# Patient Record
Sex: MALE | Race: AMERICAN INDIAN OR ALASKA NATIVE
[De-identification: names, ages, dates, MRNs, and addresses within clinical notes are randomized per-mention and may not be internally consistent; named-entity substitution may affect disease eponyms.]

---

## 2021-04-13 ENCOUNTER — HOSPITAL ENCOUNTER (OUTPATIENT)
Dept: HOSPITAL 5 - CATHLABREC | Age: 61
Discharge: HOME | End: 2021-04-13
Attending: INTERNAL MEDICINE
Payer: MEDICAID

## 2021-04-13 VITALS — DIASTOLIC BLOOD PRESSURE: 100 MMHG | SYSTOLIC BLOOD PRESSURE: 150 MMHG

## 2021-04-13 DIAGNOSIS — K21.9: ICD-10-CM

## 2021-04-13 DIAGNOSIS — Z98.890: ICD-10-CM

## 2021-04-13 DIAGNOSIS — Z83.3: ICD-10-CM

## 2021-04-13 DIAGNOSIS — I50.9: ICD-10-CM

## 2021-04-13 DIAGNOSIS — Z79.899: ICD-10-CM

## 2021-04-13 DIAGNOSIS — R94.30: Primary | ICD-10-CM

## 2021-04-13 DIAGNOSIS — E11.9: ICD-10-CM

## 2021-04-13 DIAGNOSIS — I25.2: ICD-10-CM

## 2021-04-13 DIAGNOSIS — Z82.49: ICD-10-CM

## 2021-04-13 DIAGNOSIS — G47.30: ICD-10-CM

## 2021-04-13 DIAGNOSIS — I25.10: ICD-10-CM

## 2021-04-13 DIAGNOSIS — E78.5: ICD-10-CM

## 2021-04-13 DIAGNOSIS — E66.9: ICD-10-CM

## 2021-04-13 DIAGNOSIS — I11.0: ICD-10-CM

## 2021-04-13 LAB
BASOPHILS # (AUTO): 0.1 K/MM3 (ref 0–0.1)
BASOPHILS NFR BLD AUTO: 0.9 % (ref 0–1.8)
BUN SERPL-MCNC: 19 MG/DL (ref 9–20)
BUN/CREAT SERPL: 15 %
CALCIUM SERPL-MCNC: 9.1 MG/DL (ref 8.4–10.2)
EOSINOPHIL # BLD AUTO: 0.3 K/MM3 (ref 0–0.4)
EOSINOPHIL NFR BLD AUTO: 4.1 % (ref 0–4.3)
HCT VFR BLD CALC: 36 % (ref 35.5–45.6)
HEMOLYSIS INDEX: 0
HGB BLD-MCNC: 11.8 GM/DL (ref 11.8–15.2)
INR PPP: 0.97 (ref 0.87–1.13)
LYMPHOCYTES # BLD AUTO: 2 K/MM3 (ref 1.2–5.4)
LYMPHOCYTES NFR BLD AUTO: 30.6 % (ref 13.4–35)
MCHC RBC AUTO-ENTMCNC: 33 % (ref 32–34)
MCV RBC AUTO: 77 FL (ref 84–94)
MONOCYTES # (AUTO): 0.6 K/MM3 (ref 0–0.8)
MONOCYTES % (AUTO): 8.7 % (ref 0–7.3)
PLATELET # BLD: 453 K/MM3 (ref 140–440)
RBC # BLD AUTO: 4.69 M/MM3 (ref 3.65–5.03)

## 2021-04-13 PROCEDURE — 85025 COMPLETE CBC W/AUTO DIFF WBC: CPT

## 2021-04-13 PROCEDURE — 85610 PROTHROMBIN TIME: CPT

## 2021-04-13 PROCEDURE — 99156 MOD SED OTH PHYS/QHP 5/>YRS: CPT

## 2021-04-13 PROCEDURE — 80048 BASIC METABOLIC PNL TOTAL CA: CPT

## 2021-04-13 PROCEDURE — 93458 L HRT ARTERY/VENTRICLE ANGIO: CPT

## 2021-04-13 PROCEDURE — 93005 ELECTROCARDIOGRAM TRACING: CPT

## 2021-04-13 PROCEDURE — 85730 THROMBOPLASTIN TIME PARTIAL: CPT

## 2021-04-13 PROCEDURE — 36415 COLL VENOUS BLD VENIPUNCTURE: CPT

## 2021-04-13 PROCEDURE — C1894 INTRO/SHEATH, NON-LASER: HCPCS

## 2021-04-13 RX ADMIN — LIDOCAINE HYDROCHLORIDE ONE ML: 20 INJECTION, SOLUTION INFILTRATION; PERINEURAL at 10:35

## 2021-04-13 RX ADMIN — FENTANYL CITRATE ONE MCG: 50 INJECTION, SOLUTION INTRAMUSCULAR; INTRAVENOUS at 10:40

## 2021-04-13 RX ADMIN — MIDAZOLAM ONE MG: 1 INJECTION INTRAMUSCULAR; INTRAVENOUS at 10:40

## 2021-04-13 RX ADMIN — LIDOCAINE HYDROCHLORIDE ONE ML: 20 INJECTION, SOLUTION INFILTRATION; PERINEURAL at 10:41

## 2021-04-13 RX ADMIN — MIDAZOLAM ONE MG: 1 INJECTION INTRAMUSCULAR; INTRAVENOUS at 10:35

## 2021-04-13 RX ADMIN — FENTANYL CITRATE ONE MCG: 50 INJECTION, SOLUTION INTRAMUSCULAR; INTRAVENOUS at 10:33

## 2021-04-13 NOTE — CARDIAC CATHERIZATION REPORT
CARDIAC CATHETERIZATION REPORT 



REASON FOR PROCEDURE:  The patient is a 61-year-old man with no significant

cardiac symptoms, underwent a cardiac PET scan that demonstrates lateral wall

ischemia, prompting a recommendation for cardiac catheterization. 



PROCEDURES: 

1.  Left heart catheterization. 

2.  Selective left and right coronary angiography. 

3.  Left ventricular angiography. 

4.  Sedation time, start 10:40, end 10:56. 



I was present for the entire procedure and supervised the moderate sedation

protocol.



The patient was prepped and draped in a sterile fashion after informed consent. 

The right radial cath site was prepped and draped after a negative Vishal's test.

 The right radial artery was entered using Seldinger technique followed by

placement of a 6-Azeri hydrophilic sheath.  Routine radial cocktail was

administered via the sheath.



Selective left and right coronary angiography was performed using a #3.5 left

Angy, and a #4 right Angy.  A pigtail catheter was used for left

ventricular angiography.  The catheters were then removed, sheath removed, and

hemostasis achieved using a TR band.  The patient was returned to the

postprocedure unit in stable condition.  There were no complications. 



FINDINGS: 

HEMODYNAMICS:  Left ventricular end-diastolic pressure was 25, following

coronary angiography.  Ascending aortic pressure 172/103.  There was no

significant pressure gradient on pullback across the aortic valve. 



CORONARY ANGIOGRAPHY:  The left main coronary artery was free of significant

disease. 



There was a long, up to 20% luminal stenosis of the proximal LAD.  This was

followed by another, 50% stenosis of the mid LAD.  Otherwise, mild luminal

irregularities were noted in the LAD and diagonal branches. 



The first obtuse marginal branch of the circumflex artery was a medium size

vessel, that was completely occluded in its proximal segment.  This was a long,

chronic total occlusion.  The distal anterolateral obtuse marginal was

reconstituted faintly by bridging collaterals and left to left collaterals. 

Otherwise, the AV groove circumflex then continued, terminating in another,

medium-sized terminal obtuse marginal.  No other significant lesions were noted

in the rest of the circumflex system. 



Right coronary artery was large dominant vessel.  This vessel contained mild

luminal irregularities in its proximal and mid segments, with no significant

obstructive lesions noted. 



Left ventricle was mildly dilated, there was mild-to-moderate left ventricular

systolic dysfunction with mild diffuse hypokinesis, left ventricular ejection

fraction estimated at 35-40%. 



CONCLUSION: 

1.  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

circumflex, reconstituted by bridging collaterals and left to left collaterals. 

2.  Mild nonobstructive disease of the proximal and mid segments of the LAD. 

3.  Mild-to-moderate left ventricular systolic dysfunction, ejection fraction

35-40%. 



RECOMMENDATION: 

1.  Medical therapy for chronic total occlusion of circumflex branch vessel in

an asymptomatic patient. 

2.  Aggressive risk factor modification and optimal guideline-directed medical

therapy. 

3.  Disease of the proximal and mid LAD should be followed clinically with

serial noninvasive testing indicated.





DD: 04/13/2021 11:10

DT: 04/13/2021 12:21

JOB# [id number]  [id number]

CA/NTS

## 2021-04-13 NOTE — DISCHARGE SUMMARY
Short Stay Discharge Plan


Activity: advance as tolerated


Weight Bearing Status: Full Weight Bearing


Diet: low fat, low cholesterol, low salt, diabetic


Wound: keep clean and dry


Special Instructions: smoking cessation, no heavy lifting (3 days), hold 

Metformin (48 hours)


Follow up with: 


PRIMARY CARE,MD [Primary Care Provider] - 7 Days


SHAHNAZ HDZ MD [Staff Physician] - 7 Days

## 2021-04-15 NOTE — ELECTROCARDIOGRAPH REPORT
[facility]

                                       

Test Date:    2021               Test Time:    09:00:17

Pat Name:     FAZAL ALFRED             Department:   

Patient ID:   [id number]          Room:          

Gender:       M                        Technician:   OTTO

:          [id number]               Requested By: KRYSTLE SHEIKH

Order Number: [id number]              Reading MD:   Krystle Sheikh

                                 Measurements

Intervals                              Axis          

Rate:         70                       P:            47

CO:           325                      QRS:          -63

QRSD:         131                      T:            50

QT:           410                                    

QTc:          443                                    

                           Interpretive Statements

Sinus rhythm

Prolonged CO interval

Left axis deviation

Incomplete right bundle branch block

No previous ECG available for comparison

Electronically Signed On 4- 11:25:41 EDT by Krystle Sheikh